# Patient Record
Sex: FEMALE | Race: WHITE | NOT HISPANIC OR LATINO | Employment: UNEMPLOYED | RURAL
[De-identification: names, ages, dates, MRNs, and addresses within clinical notes are randomized per-mention and may not be internally consistent; named-entity substitution may affect disease eponyms.]

---

## 2021-07-26 ENCOUNTER — OFFICE VISIT (OUTPATIENT)
Dept: FAMILY MEDICINE | Facility: CLINIC | Age: 41
End: 2021-07-26
Payer: OTHER GOVERNMENT

## 2021-07-26 VITALS
OXYGEN SATURATION: 100 % | WEIGHT: 150 LBS | HEIGHT: 65 IN | BODY MASS INDEX: 24.99 KG/M2 | TEMPERATURE: 98 F | RESPIRATION RATE: 18 BRPM | HEART RATE: 81 BPM

## 2021-07-26 DIAGNOSIS — R06.02 SHORTNESS OF BREATH: Primary | ICD-10-CM

## 2021-07-26 LAB
CTP QC/QA: YES
SARS-COV-2 AG RESP QL IA.RAPID: NEGATIVE

## 2021-07-26 PROCEDURE — 87426 SARS CORONAVIRUS 2 ANTIGEN POCT: ICD-10-PCS | Mod: QW,,, | Performed by: FAMILY MEDICINE

## 2021-07-26 PROCEDURE — 99213 PR OFFICE/OUTPT VISIT, EST, LEVL III, 20-29 MIN: ICD-10-PCS | Mod: ,,, | Performed by: FAMILY MEDICINE

## 2021-07-26 PROCEDURE — 99213 OFFICE O/P EST LOW 20 MIN: CPT | Mod: ,,, | Performed by: FAMILY MEDICINE

## 2021-07-26 PROCEDURE — 87426 SARSCOV CORONAVIRUS AG IA: CPT | Mod: QW,,, | Performed by: FAMILY MEDICINE

## 2021-07-26 RX ORDER — HYDROCODONE BITARTRATE 40 MG/1
TABLET, EXTENDED RELEASE ORAL
COMMUNITY
Start: 2021-07-07

## 2021-07-26 RX ORDER — FOLIC ACID 1 MG/1
1 TABLET ORAL
COMMUNITY

## 2021-07-26 RX ORDER — OXYCODONE AND ACETAMINOPHEN 10; 325 MG/1; MG/1
TABLET ORAL
COMMUNITY
Start: 2020-11-03

## 2021-07-26 RX ORDER — HYDROCODONE BITARTRATE 40 MG/1
TABLET, EXTENDED RELEASE ORAL
COMMUNITY
Start: 2020-11-03 | End: 2021-07-26 | Stop reason: SDUPTHER

## 2021-07-26 RX ORDER — TIZANIDINE 4 MG/1
TABLET ORAL
COMMUNITY
Start: 2020-11-03

## 2021-07-26 RX ORDER — LACOSAMIDE 200 MG/1
200 TABLET, FILM COATED ORAL 2 TIMES DAILY
COMMUNITY
Start: 2021-07-13

## 2021-07-26 RX ORDER — CARBAMAZEPINE 400 MG/1
400 TABLET, EXTENDED RELEASE ORAL 2 TIMES DAILY
COMMUNITY
Start: 2021-07-13

## 2021-07-31 ENCOUNTER — HOSPITAL ENCOUNTER (EMERGENCY)
Facility: HOSPITAL | Age: 41
Discharge: HOME | End: 2021-07-31
Payer: OTHER GOVERNMENT

## 2021-07-31 VITALS
RESPIRATION RATE: 16 BRPM | TEMPERATURE: 98 F | WEIGHT: 137 LBS | DIASTOLIC BLOOD PRESSURE: 78 MMHG | SYSTOLIC BLOOD PRESSURE: 137 MMHG | OXYGEN SATURATION: 98 % | HEART RATE: 73 BPM | BODY MASS INDEX: 22.82 KG/M2 | HEIGHT: 65 IN

## 2021-07-31 DIAGNOSIS — K04.7 DENTAL ABSCESS: Primary | ICD-10-CM

## 2021-07-31 PROCEDURE — 99283 EMERGENCY DEPT VISIT LOW MDM: CPT

## 2021-07-31 PROCEDURE — 99283 EMERGENCY DEPT VISIT LOW MDM: CPT | Mod: ,,, | Performed by: NURSE PRACTITIONER

## 2021-07-31 PROCEDURE — 99283 PR EMERGENCY DEPT VISIT,LEVEL III: ICD-10-PCS | Mod: ,,, | Performed by: NURSE PRACTITIONER

## 2021-07-31 RX ORDER — AMOXICILLIN AND CLAVULANATE POTASSIUM 875; 125 MG/1; MG/1
1 TABLET, FILM COATED ORAL 2 TIMES DAILY
Qty: 14 TABLET | Refills: 0 | Status: SHIPPED | OUTPATIENT
Start: 2021-07-31

## 2023-01-04 ENCOUNTER — HOSPITAL ENCOUNTER (OUTPATIENT)
Dept: RADIOLOGY | Facility: HOSPITAL | Age: 43
Discharge: HOME | End: 2023-01-04
Attending: ORTHOPAEDIC SURGERY
Payer: OTHER GOVERNMENT

## 2023-01-04 ENCOUNTER — OFFICE VISIT (OUTPATIENT)
Dept: ORTHOPEDICS | Facility: CLINIC | Age: 43
End: 2023-01-04
Payer: OTHER GOVERNMENT

## 2023-01-04 DIAGNOSIS — M25.561 ACUTE PAIN OF RIGHT KNEE: Primary | ICD-10-CM

## 2023-01-04 DIAGNOSIS — M25.561 ACUTE PAIN OF RIGHT KNEE: ICD-10-CM

## 2023-01-04 PROCEDURE — 73562 XR KNEE AP LAT WITH SUNRISE RIGHT: ICD-10-PCS | Mod: 26,RT,, | Performed by: ORTHOPAEDIC SURGERY

## 2023-01-04 PROCEDURE — 73562 X-RAY EXAM OF KNEE 3: CPT | Mod: 26,RT,, | Performed by: ORTHOPAEDIC SURGERY

## 2023-01-04 PROCEDURE — 99213 OFFICE O/P EST LOW 20 MIN: CPT | Mod: PBBFAC | Performed by: ORTHOPAEDIC SURGERY

## 2023-01-04 PROCEDURE — 73562 X-RAY EXAM OF KNEE 3: CPT | Mod: PBBFAC | Performed by: ORTHOPAEDIC SURGERY

## 2023-01-04 PROCEDURE — 99203 OFFICE O/P NEW LOW 30 MIN: CPT | Mod: S$PBB,,, | Performed by: ORTHOPAEDIC SURGERY

## 2023-01-04 PROCEDURE — 73562 X-RAY EXAM OF KNEE 3: CPT | Mod: TC,RT

## 2023-01-04 PROCEDURE — 99203 PR OFFICE/OUTPT VISIT, NEW, LEVL III, 30-44 MIN: ICD-10-PCS | Mod: S$PBB,,, | Performed by: ORTHOPAEDIC SURGERY
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
## 2024-08-29 ENCOUNTER — HOSPITAL ENCOUNTER (OUTPATIENT)
Dept: RADIOLOGY | Facility: HOSPITAL | Age: 44
Discharge: HOME | End: 2024-08-29
Payer: OTHER GOVERNMENT

## 2024-08-29 VITALS — HEIGHT: 65 IN | WEIGHT: 140 LBS | BODY MASS INDEX: 23.32 KG/M2

## 2024-08-29 DIAGNOSIS — Z12.31 VISIT FOR SCREENING MAMMOGRAM: ICD-10-CM

## 2024-08-29 PROCEDURE — 77063 BREAST TOMOSYNTHESIS BI: CPT | Mod: TC

## 2025-08-28 ENCOUNTER — OFFICE VISIT (OUTPATIENT)
Dept: FAMILY MEDICINE | Facility: CLINIC | Age: 45
End: 2025-08-28
Payer: OTHER GOVERNMENT

## 2025-08-28 ENCOUNTER — TELEPHONE (OUTPATIENT)
Dept: FAMILY MEDICINE | Facility: CLINIC | Age: 45
End: 2025-08-28
Payer: OTHER GOVERNMENT

## 2025-08-28 VITALS
RESPIRATION RATE: 18 BRPM | HEIGHT: 65 IN | DIASTOLIC BLOOD PRESSURE: 88 MMHG | TEMPERATURE: 99 F | HEART RATE: 70 BPM | OXYGEN SATURATION: 98 % | SYSTOLIC BLOOD PRESSURE: 124 MMHG | WEIGHT: 134 LBS | BODY MASS INDEX: 22.33 KG/M2

## 2025-08-28 DIAGNOSIS — Z11.59 NEED FOR HEPATITIS C SCREENING TEST: ICD-10-CM

## 2025-08-28 DIAGNOSIS — Z11.4 SCREENING FOR HIV (HUMAN IMMUNODEFICIENCY VIRUS): ICD-10-CM

## 2025-08-28 DIAGNOSIS — Q61.3 POLYCYSTIC KIDNEY: ICD-10-CM

## 2025-08-28 DIAGNOSIS — R63.4 WEIGHT LOSS: Primary | ICD-10-CM

## 2025-08-28 LAB
ALBUMIN SERPL BCP-MCNC: 3.9 G/DL (ref 3.5–5)
ALBUMIN/GLOB SERPL: 1.1 {RATIO}
ALP SERPL-CCNC: 57 U/L (ref 40–150)
ALT SERPL W P-5'-P-CCNC: 8 U/L
ANION GAP SERPL CALCULATED.3IONS-SCNC: 10 MMOL/L (ref 7–16)
AST SERPL W P-5'-P-CCNC: 21 U/L (ref 11–45)
BASOPHILS # BLD AUTO: 0.05 K/UL (ref 0–0.2)
BASOPHILS NFR BLD AUTO: 1 % (ref 0–1)
BILIRUB SERPL-MCNC: 0.3 MG/DL
BUN SERPL-MCNC: 11 MG/DL (ref 7–19)
BUN/CREAT SERPL: 15 (ref 6–20)
CALCIUM SERPL-MCNC: 8.8 MG/DL (ref 8.4–10.2)
CHLORIDE SERPL-SCNC: 99 MMOL/L (ref 98–107)
CO2 SERPL-SCNC: 29 MMOL/L (ref 22–29)
CREAT SERPL-MCNC: 0.72 MG/DL (ref 0.55–1.02)
DIFFERENTIAL METHOD BLD: ABNORMAL
EGFR (NO RACE VARIABLE) (RUSH/TITUS): 106 ML/MIN/1.73M2
EOSINOPHIL # BLD AUTO: 0.13 K/UL (ref 0–0.5)
EOSINOPHIL NFR BLD AUTO: 2.7 % (ref 1–4)
ERYTHROCYTE [DISTWIDTH] IN BLOOD BY AUTOMATED COUNT: 11.5 % (ref 11.5–14.5)
GLOBULIN SER-MCNC: 3.4 G/DL (ref 2–4)
GLUCOSE SERPL-MCNC: 73 MG/DL (ref 74–100)
HCT VFR BLD AUTO: 40.3 % (ref 38–47)
HCV AB SER QL: NORMAL
HGB BLD-MCNC: 13.2 G/DL (ref 12–16)
HIV 1+O+2 AB SERPL QL: NORMAL
IMM GRANULOCYTES # BLD AUTO: 0.01 K/UL (ref 0–0.04)
IMM GRANULOCYTES NFR BLD: 0.2 % (ref 0–0.4)
LYMPHOCYTES # BLD AUTO: 1.74 K/UL (ref 1–4.8)
LYMPHOCYTES NFR BLD AUTO: 36.1 % (ref 27–41)
MCH RBC QN AUTO: 33 PG (ref 27–31)
MCHC RBC AUTO-ENTMCNC: 32.8 G/DL (ref 32–36)
MCV RBC AUTO: 100.8 FL (ref 80–96)
MONOCYTES # BLD AUTO: 0.32 K/UL (ref 0–0.8)
MONOCYTES NFR BLD AUTO: 6.6 % (ref 2–6)
MPC BLD CALC-MCNC: 11.8 FL (ref 9.4–12.4)
NEUTROPHILS # BLD AUTO: 2.57 K/UL (ref 1.8–7.7)
NEUTROPHILS NFR BLD AUTO: 53.4 % (ref 53–65)
NRBC # BLD AUTO: 0 X10E3/UL
NRBC, AUTO (.00): 0 %
PLATELET # BLD AUTO: 280 K/UL (ref 150–400)
POTASSIUM SERPL-SCNC: 3.8 MMOL/L (ref 3.5–5.1)
PROT SERPL-MCNC: 7.3 G/DL (ref 6.4–8.3)
RBC # BLD AUTO: 4 M/UL (ref 4.2–5.4)
SODIUM SERPL-SCNC: 134 MMOL/L (ref 136–145)
T4 FREE SERPL-MCNC: 0.87 NG/DL (ref 0.7–1.48)
TSH SERPL DL<=0.005 MIU/L-ACNC: 0.97 UIU/ML (ref 0.35–4.94)
WBC # BLD AUTO: 4.82 K/UL (ref 4.5–11)

## 2025-08-28 PROCEDURE — 80050 GENERAL HEALTH PANEL: CPT | Mod: ,,, | Performed by: CLINICAL MEDICAL LABORATORY

## 2025-08-28 PROCEDURE — 84439 ASSAY OF FREE THYROXINE: CPT | Mod: ,,, | Performed by: CLINICAL MEDICAL LABORATORY

## 2025-08-28 PROCEDURE — 87389 HIV-1 AG W/HIV-1&-2 AB AG IA: CPT | Mod: ,,, | Performed by: CLINICAL MEDICAL LABORATORY

## 2025-08-28 PROCEDURE — 86803 HEPATITIS C AB TEST: CPT | Mod: ,,, | Performed by: CLINICAL MEDICAL LABORATORY

## 2025-08-28 RX ORDER — LORAZEPAM 1 MG/1
1 TABLET ORAL DAILY PRN
COMMUNITY

## 2025-08-28 RX ORDER — NALOXONE HYDROCHLORIDE 4 MG/.1ML
SPRAY NASAL
COMMUNITY

## 2025-08-28 RX ORDER — ZOLPIDEM TARTRATE 5 MG/1
5 TABLET ORAL NIGHTLY PRN
COMMUNITY
Start: 2025-07-10

## 2025-08-29 ENCOUNTER — TELEPHONE (OUTPATIENT)
Dept: FAMILY MEDICINE | Facility: CLINIC | Age: 45
End: 2025-08-29
Payer: OTHER GOVERNMENT